# Patient Record
Sex: FEMALE | Race: ASIAN | NOT HISPANIC OR LATINO | URBAN - METROPOLITAN AREA
[De-identification: names, ages, dates, MRNs, and addresses within clinical notes are randomized per-mention and may not be internally consistent; named-entity substitution may affect disease eponyms.]

---

## 2021-12-29 ENCOUNTER — OUTPATIENT (OUTPATIENT)
Dept: OUTPATIENT SERVICES | Facility: HOSPITAL | Age: 41
LOS: 1 days | End: 2021-12-29
Payer: COMMERCIAL

## 2021-12-29 ENCOUNTER — APPOINTMENT (OUTPATIENT)
Dept: RADIOLOGY | Facility: IMAGING CENTER | Age: 41
End: 2021-12-29
Payer: COMMERCIAL

## 2021-12-29 DIAGNOSIS — Z00.8 ENCOUNTER FOR OTHER GENERAL EXAMINATION: ICD-10-CM

## 2021-12-29 PROCEDURE — 73030 X-RAY EXAM OF SHOULDER: CPT | Mod: 26,RT

## 2021-12-29 PROCEDURE — 73110 X-RAY EXAM OF WRIST: CPT | Mod: 26,RT

## 2021-12-29 PROCEDURE — 73030 X-RAY EXAM OF SHOULDER: CPT

## 2021-12-29 PROCEDURE — 73110 X-RAY EXAM OF WRIST: CPT

## 2022-01-04 ENCOUNTER — APPOINTMENT (OUTPATIENT)
Dept: ORTHOPEDIC SURGERY | Facility: CLINIC | Age: 42
End: 2022-01-04
Payer: COMMERCIAL

## 2022-01-04 VITALS
SYSTOLIC BLOOD PRESSURE: 121 MMHG | HEIGHT: 61 IN | HEART RATE: 69 BPM | BODY MASS INDEX: 33.99 KG/M2 | DIASTOLIC BLOOD PRESSURE: 83 MMHG | WEIGHT: 180 LBS

## 2022-01-04 DIAGNOSIS — M25.511 PAIN IN RIGHT SHOULDER: ICD-10-CM

## 2022-01-04 DIAGNOSIS — Z78.9 OTHER SPECIFIED HEALTH STATUS: ICD-10-CM

## 2022-01-04 DIAGNOSIS — M54.50 LOW BACK PAIN, UNSPECIFIED: ICD-10-CM

## 2022-01-04 DIAGNOSIS — M54.12 RADICULOPATHY, CERVICAL REGION: ICD-10-CM

## 2022-01-04 PROCEDURE — 72100 X-RAY EXAM L-S SPINE 2/3 VWS: CPT

## 2022-01-04 PROCEDURE — 72040 X-RAY EXAM NECK SPINE 2-3 VW: CPT

## 2022-01-04 PROCEDURE — 99203 OFFICE O/P NEW LOW 30 MIN: CPT

## 2022-01-04 RX ORDER — CHOLECALCIFEROL (VITAMIN D3) 25 MCG
TABLET ORAL
Refills: 0 | Status: ACTIVE | COMMUNITY

## 2022-01-04 NOTE — DISCUSSION/SUMMARY
[de-identified] : The patient has a sprain to the cervical and to the lumbar spine.  She has cervical radiculitis.  She has an element of right shoulder tendinitis as well.  I have discussed the pathology, natural history and treatment options with her.  She is started on a course of physical therapy.  She will take Tylenol for her pain.  If that is not adequate she will consider ibuprofen.  Medication risks have been reviewed.  She will be reevaluated in 6 weeks.

## 2022-01-04 NOTE — PHYSICAL EXAM
[DP] : dorsalis pedis 2+ and symmetric bilaterally [PT] : posterior tibial 2+ and symmetric bilaterally [Rad] : radial 2+ and symmetric bilaterally [Normal] : Alert and in no acute distress [Poor Appearance] : well-appearing [Acute Distress] : not in acute distress [Obese] : not obese [de-identified] : The patient has no respiratory distress. Mood and affect are normal. The patient is alert and oriented to person, place and time.\par Examination of the cervical spine demonstrates no deformity. There is tenderness of the right paracervical muscles and the right trapezius muscle. There is mild muscle spasm. Cervical spine range of motion is right lateral rotation of 40°, left lateral rotation of 40°, extension of 45° and flexion of 45°. Upper extremity neurologic exam is intact with regard to sensation. Motor function is 5 over 5 in all groups in the upper extremities. Deep tendon reflexes are 2+ and equal at the biceps, triceps and brachial radialis.\par Examination of the right shoulder demonstrates no deformity. The skin is intact. There is no erythema. There is tenderness anteriorly. Impingement sign is positive There is no instability. Drop arm test is negative. Empty can test is negative. Liftoff test is negative. Nobles test is negative.  She has elevation of 150 degrees right and left.  She has external rotation of 45 degrees right and left.  She has internal rotation to the mid lumbar level on the right and upper lumbar level on the left.  The elbows are stable.  There is no lymphedema.\par Examination of the lumbar spine demonstrates tenderness to the right of the midline. There is mild muscle spasm. There is no deformity. Lumbar flexion is 90°, right lateral flexion 10° and left lateral flexion 10°. Straight leg raise test is negative. Lower extremity neurologic exam is intact with regard to sensation, motor function and deep tendon reflexes.  Trendelenburg is negative.  There is no pain with hip range of motion.  There is no pain with knee motion.  There is no lymphedema. [de-identified] : \par EXAM: XR SHOULDER COMP MIN 2V RT\par PROCEDURE DATE: 12/29/2021\par INTERPRETATION: CLINICAL INDICATION: right shoulder pain\par EXAM:\par Internal and external rotation AP right shoulder from 12/29/2021 at 1133. No similar prior studies available for comparison.\par \par IMPRESSION:\par No fractures, dislocations, or AC separation.\par \par Preserved joint spaces and smooth articular margins.\par \par Maintained subacromial and coracoclavicular spaces.\par \par Bone island versus superimposed nodular soft tissue calcification projecting over lower lateral scapular margin. No additional focal osseous lesions.\par \par --- End of Report ---\par \par GAVI CUELLAR MD; Attending Radiologist\par This document has been electronically signed. Dec 29 2021 11:46AM\par \par \par \par EXAM: XR WRIST COMP MIN 3 VIEWS RT\par PROCEDURE DATE: 12/29/2021\par INTERPRETATION: CLINICAL INDICATION: right wrist pain\par EXAM:\par Frontal, oblique, and lateral right wrist from 12/29/2021 at 1133. No similar prior studies available for comparison.\par \par IMPRESSION:\par No fractures or dislocations.\par \par Preserved joint spaces and no joint margin erosions.\par \par Carpal bones normally aligned.\par \par Neutral ulnar variance.\par \par No lytic or blastic lesions.\par \par --- End of Report ---\par \par GAVI CUELLAR MD; Attending Radiologist\par This document has been electronically signed. Dec 29 2021 11:46AM\par \par \par X-rays of the right shoulder and right wrist taken December 29, 2021 are reviewed.  There are no fractures or dislocations.\par AP and lateral x-rays of the cervical spine taken today demonstrate no fracture or dislocation.  There is straightening of the normal curve.  AP and lateral x-rays of the lumbar spine taken today demonstrate no fracture, no dislocation and no bony abnormality.
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
## 2022-01-25 ENCOUNTER — RESULT REVIEW (OUTPATIENT)
Age: 42
End: 2022-01-25

## 2022-01-25 ENCOUNTER — APPOINTMENT (OUTPATIENT)
Dept: MRI IMAGING | Facility: CLINIC | Age: 42
End: 2022-01-25
Payer: COMMERCIAL

## 2022-01-25 ENCOUNTER — OUTPATIENT (OUTPATIENT)
Dept: OUTPATIENT SERVICES | Facility: HOSPITAL | Age: 42
LOS: 1 days | End: 2022-01-25
Payer: COMMERCIAL

## 2022-01-25 DIAGNOSIS — R42 DIZZINESS AND GIDDINESS: ICD-10-CM

## 2022-01-25 PROCEDURE — 70551 MRI BRAIN STEM W/O DYE: CPT

## 2022-01-25 PROCEDURE — 70551 MRI BRAIN STEM W/O DYE: CPT | Mod: 26

## 2022-06-07 ENCOUNTER — OUTPATIENT (OUTPATIENT)
Dept: OUTPATIENT SERVICES | Facility: HOSPITAL | Age: 42
LOS: 1 days | End: 2022-06-07
Payer: COMMERCIAL

## 2022-06-07 ENCOUNTER — APPOINTMENT (OUTPATIENT)
Dept: MAMMOGRAPHY | Facility: IMAGING CENTER | Age: 42
End: 2022-06-07
Payer: COMMERCIAL

## 2022-06-07 DIAGNOSIS — Z00.8 ENCOUNTER FOR OTHER GENERAL EXAMINATION: ICD-10-CM

## 2022-06-07 PROCEDURE — 77063 BREAST TOMOSYNTHESIS BI: CPT

## 2022-06-07 PROCEDURE — 77067 SCR MAMMO BI INCL CAD: CPT | Mod: 26

## 2022-06-07 PROCEDURE — 77067 SCR MAMMO BI INCL CAD: CPT

## 2022-06-07 PROCEDURE — 77063 BREAST TOMOSYNTHESIS BI: CPT | Mod: 26

## 2022-08-13 ENCOUNTER — OUTPATIENT (OUTPATIENT)
Dept: OUTPATIENT SERVICES | Facility: HOSPITAL | Age: 42
LOS: 1 days | End: 2022-08-13
Payer: COMMERCIAL

## 2022-08-13 ENCOUNTER — APPOINTMENT (OUTPATIENT)
Dept: ULTRASOUND IMAGING | Facility: IMAGING CENTER | Age: 42
End: 2022-08-13

## 2022-08-13 ENCOUNTER — APPOINTMENT (OUTPATIENT)
Dept: MAMMOGRAPHY | Facility: IMAGING CENTER | Age: 42
End: 2022-08-13

## 2022-08-13 DIAGNOSIS — Z00.8 ENCOUNTER FOR OTHER GENERAL EXAMINATION: ICD-10-CM

## 2022-08-13 PROCEDURE — 76642 ULTRASOUND BREAST LIMITED: CPT | Mod: 26,RT

## 2022-08-13 PROCEDURE — G0279: CPT | Mod: 26

## 2022-08-13 PROCEDURE — 77065 DX MAMMO INCL CAD UNI: CPT

## 2022-08-13 PROCEDURE — 77065 DX MAMMO INCL CAD UNI: CPT | Mod: 26,RT

## 2022-08-13 PROCEDURE — 76642 ULTRASOUND BREAST LIMITED: CPT

## 2022-08-13 PROCEDURE — G0279: CPT

## 2023-12-25 ENCOUNTER — EMERGENCY (EMERGENCY)
Facility: HOSPITAL | Age: 43
LOS: 1 days | Discharge: HOME | End: 2023-12-25
Admitting: EMERGENCY MEDICINE
Payer: COMMERCIAL

## 2023-12-25 VITALS
DIASTOLIC BLOOD PRESSURE: 85 MMHG | OXYGEN SATURATION: 98 % | SYSTOLIC BLOOD PRESSURE: 137 MMHG | HEART RATE: 75 BPM | RESPIRATION RATE: 18 BRPM | TEMPERATURE: 98 F

## 2023-12-25 LAB
APPEARANCE UR: ABNORMAL
APPEARANCE UR: ABNORMAL
BACTERIA # UR AUTO: NEGATIVE /HPF — SIGNIFICANT CHANGE UP
BACTERIA # UR AUTO: NEGATIVE /HPF — SIGNIFICANT CHANGE UP
BILIRUB UR-MCNC: NEGATIVE — SIGNIFICANT CHANGE UP
BILIRUB UR-MCNC: NEGATIVE — SIGNIFICANT CHANGE UP
CAST: 0 /LPF — SIGNIFICANT CHANGE UP (ref 0–4)
CAST: 0 /LPF — SIGNIFICANT CHANGE UP (ref 0–4)
COLOR SPEC: YELLOW — SIGNIFICANT CHANGE UP
COLOR SPEC: YELLOW — SIGNIFICANT CHANGE UP
DIFF PNL FLD: ABNORMAL
DIFF PNL FLD: ABNORMAL
GLUCOSE UR QL: NEGATIVE MG/DL — SIGNIFICANT CHANGE UP
GLUCOSE UR QL: NEGATIVE MG/DL — SIGNIFICANT CHANGE UP
KETONES UR-MCNC: NEGATIVE MG/DL — SIGNIFICANT CHANGE UP
KETONES UR-MCNC: NEGATIVE MG/DL — SIGNIFICANT CHANGE UP
LEUKOCYTE ESTERASE UR-ACNC: NEGATIVE — SIGNIFICANT CHANGE UP
LEUKOCYTE ESTERASE UR-ACNC: NEGATIVE — SIGNIFICANT CHANGE UP
NITRITE UR-MCNC: NEGATIVE — SIGNIFICANT CHANGE UP
NITRITE UR-MCNC: NEGATIVE — SIGNIFICANT CHANGE UP
PH UR: 8 — SIGNIFICANT CHANGE UP (ref 5–8)
PH UR: 8 — SIGNIFICANT CHANGE UP (ref 5–8)
PROT UR-MCNC: NEGATIVE MG/DL — SIGNIFICANT CHANGE UP
PROT UR-MCNC: NEGATIVE MG/DL — SIGNIFICANT CHANGE UP
RBC CASTS # UR COMP ASSIST: 5 /HPF — HIGH (ref 0–4)
RBC CASTS # UR COMP ASSIST: 5 /HPF — HIGH (ref 0–4)
SP GR SPEC: 1.02 — SIGNIFICANT CHANGE UP (ref 1–1.03)
SP GR SPEC: 1.02 — SIGNIFICANT CHANGE UP (ref 1–1.03)
SQUAMOUS # UR AUTO: 1 /HPF — SIGNIFICANT CHANGE UP (ref 0–5)
SQUAMOUS # UR AUTO: 1 /HPF — SIGNIFICANT CHANGE UP (ref 0–5)
UROBILINOGEN FLD QL: 0.2 MG/DL — SIGNIFICANT CHANGE UP (ref 0.2–1)
UROBILINOGEN FLD QL: 0.2 MG/DL — SIGNIFICANT CHANGE UP (ref 0.2–1)
WBC UR QL: 1 /HPF — SIGNIFICANT CHANGE UP (ref 0–5)
WBC UR QL: 1 /HPF — SIGNIFICANT CHANGE UP (ref 0–5)

## 2023-12-25 PROCEDURE — 99284 EMERGENCY DEPT VISIT MOD MDM: CPT

## 2023-12-25 RX ORDER — KETOROLAC TROMETHAMINE 30 MG/ML
30 SYRINGE (ML) INJECTION ONCE
Refills: 0 | Status: DISCONTINUED | OUTPATIENT
Start: 2023-12-25 | End: 2023-12-25

## 2023-12-25 RX ORDER — DIAZEPAM 5 MG
5 TABLET ORAL ONCE
Refills: 0 | Status: DISCONTINUED | OUTPATIENT
Start: 2023-12-25 | End: 2023-12-25

## 2023-12-25 RX ADMIN — Medication 30 MILLIGRAM(S): at 21:44

## 2023-12-25 RX ADMIN — Medication 5 MILLIGRAM(S): at 21:44

## 2023-12-25 NOTE — ED ADULT TRIAGE NOTE - CHIEF COMPLAINT QUOTE
pt c/o of lower back pain s/p trauma 3 weeks ago, pt ambulatory, no neuro deficits noted, denies dysuria

## 2023-12-25 NOTE — ED ADULT NURSE NOTE - NSFALLRISKINTERV_ED_ALL_ED
Communicate fall risk and risk factors to all staff, patient, and family/Provide visual cue: yellow wristband, yellow gown, etc/Reinforce activity limits and safety measures with patient and family/Call bell, personal items and telephone in reach/Instruct patient to call for assistance before getting out of bed/chair/stretcher/Non-slip footwear applied when patient is off stretcher/[place] to call system/Physically safe environment - no spills, clutter or unnecessary equipment/Purposeful Proactive Rounding/Room/bathroom lighting operational, light cord in reach Communicate fall risk and risk factors to all staff, patient, and family/Provide visual cue: yellow wristband, yellow gown, etc/Reinforce activity limits and safety measures with patient and family/Call bell, personal items and telephone in reach/Instruct patient to call for assistance before getting out of bed/chair/stretcher/Non-slip footwear applied when patient is off stretcher/[place] to call system/Physically safe environment - no spills, clutter or unnecessary equipment/Purposeful Proactive Rounding/Room/bathroom lighting operational, light cord in reach

## 2023-12-25 NOTE — ED PROVIDER NOTE - CLINICAL SUMMARY MEDICAL DECISION MAKING FREE TEXT BOX
43-year-old female with past medical history of presents with lower back pain status post fall around Thanksgiving.    Differential diagnoses includes lumbago versus musculoskeletal spasm / strain versus sciatica, unlikely fx given acuity but as there is trauma is still on the inferential. No back pain red flags on history or physical. Presentation not consistent with malignancy (lack of history of malignancy, lack of B symptoms), cauda equina (no bowel or urinary incontinence/retention, no saddle anesthesia, no distal weakness), AAA, viscus perforation , pulmonary embolism, renal colic, pyelonephritis (afebrile, no CVAT, no urinary symptoms). Given the clinical picture, no indication for imaging at this time.    Plan: CT, pain control, supportive care, reassess

## 2023-12-25 NOTE — ED PROVIDER NOTE - OBJECTIVE STATEMENT
43-year-old female with past medical history of presents with lower back pain status post fall around [facility].    Patient states she was washing her baby in the bathroom when she slipped and hit her lower back pain to the corner of the base and and has been experiencing pain ever since then.  Patient states pain is different than her sciatic pain as she is not feeling the paresthesias type pain that she typically feels but rather is experiencing a shooting pain in her left hip going into the knee and into the lower back.  States she did not hit her head or lose consciousness.  Typically with her sciatica patient states she experiencing shooting pain down her left leg and feet.  Since then patient states she has been taking acetaminophen/ibuprofen with minimal to no relief.  In addition to this she has seen a chiropractor twice which has not helped her pain at all.  Patient states since having this injury she has not had any imaging done.    Denies fevers, chills, bowel or urine incontinence, paresthesias, weakness, saddle anesthesia, rectal bleeding or pain, abdominal pain, nausea, vomiting, diarrhea, constipation, chest pain, shortness of breath, rash, upper extremity pain, swelling, change in color or temperature to extremities, dizziness, lightheadedness, syncope. 43-year-old female with past medical history of presents with lower back pain status post fall around [facility].    Patient states she was washing her baby in the bathroom when she slipped and hit her lower back pain to the corner of the base and and has been experiencing pain ever since then.  Patient states pain is different than her sciatic pain as she is not feeling the paresthesias type pain that she typically feels but rather is experiencing a shooting pain in her left hip going into the knee and into the lower back.  States she did not hit her head or lose consciousness.  Typically with her sciatica patient states she experiencing shooting pain down her left leg and feet.  Since then patient states she has been taking acetaminophen/ibuprofen with minimal to no relief.  In addition to this she has seen a chiropractor twice which has not helped her pain at all.  Patient states since having this injury she has not had any imaging done.    Denies fevers, chills, bowel or urine incontinence, paresthesias, weakness, saddle anesthesia, rectal bleeding or pain, abdominal pain, nausea, vomiting, diarrhea, constipation, chest pain, shortness of breath, rash, upper extremity pain, swelling, change in color or temperature to extremities, dizziness, lightheadedness, syncope.

## 2023-12-25 NOTE — ED PROVIDER NOTE - PROGRESS NOTE DETAILS
ROMIE Wu: Patient states after pain medication has been able to sleep comfortably in the stretcher and feels well.  Patient pending CT at time of reassessment.  Patient aware there are 3 patients of the head of her imaging.  Will continue to monitor ROMIE GARCIA: Patient signed out to me to f/u CT L spine and pelvis. CT L spine and pelvis w/ no acute fracture. Patient reassessed, sitting comfortably in chair in NAD, denies any complaints. States feeling better, symptoms improved, ambulating w/o difficulty. Pt is medically stable for discharge and follow up with PMD and orthopedic spine. The patient was given verbal and written discharge instructions. Specifically, instructions when to return to the ED and when to seek follow-up from their pcp was discussed. Any specialty follow-up was discussed, including how to make an appointment.  Instructions were discussed in simple, plain language and was understood by the patient. The patient understands that should their symptoms worsen or any new symptoms arise, they should return to the ED immediately for further evaluation. All pt's questions were answered. Patient verbalizes understanding.

## 2023-12-25 NOTE — ED ADULT NURSE NOTE - OBJECTIVE STATEMENT
pt received to intake room 10a a&ox4 ambulatory with no past medical history c/o lower back pain after single mechanical slip and fall three weeks ago. pt denies head strike or LOC. pt c/o pain to bilateral lower lumbar region. pt ambulatory with steady gait. pt with no neuro or sensory deficits. normal strength and sensation to x4 extremities. pt denies chest pain, sob, nausea, vomiting, dizziness, headache, fevers/chills. ucg negative. pt medicated as per EMAR. pt pending CT. pt taken to RW.

## 2023-12-25 NOTE — ED PROVIDER NOTE - NSFOLLOWUPINSTRUCTIONS_ED_ALL_ED_FT
** You were seen in the emergency room for pain to your back.  Your CT scan did not show any acute bony abnormalities, please take pain medication as discussed.  Will provide you with orthopedic clinic information for follow-up if pain continues.  Please return if any new, worsening or concerning symptoms develop. **    Acute Back Pain, Adult  Acute back pain is sudden and usually short-lived. It is often caused by an injury to the muscles and tissues in the back. The injury may result from:  A muscle, tendon, or ligament getting overstretched or torn. Ligaments are tissues that connect bones to each other. Lifting something improperly can cause a back strain.  Wear and tear (degeneration) of the spinal disks. Spinal disks are circular tissue that provide cushioning between the bones of the spine (vertebrae).  Twisting motions, such as while playing sports or doing yard work.  A hit to the back.  Arthritis.  You may have a physical exam, lab tests, and imaging tests to find the cause of your pain. Acute back pain usually goes away with rest and home care.    Follow these instructions at home:  Managing pain, stiffness, and swelling    Take over-the-counter and prescription medicines only as told by your health care provider. Treatment may include medicines for pain and inflammation that are taken by mouth or applied to the skin, or muscle relaxants.  Your health care provider may recommend applying ice during the first 24–48 hours after your pain starts. To do this:  Put ice in a plastic bag.  Place a towel between your skin and the bag.  Leave the ice on for 20 minutes, 2–3 times a day.  Remove the ice if your skin turns bright red. This is very important. If you cannot feel pain, heat, or cold, you have a greater risk of damage to the area.  If directed, apply heat to the affected area as often as told by your health care provider. Use the heat source that your health care provider recommends, such as a moist heat pack or a heating pad.  Place a towel between your skin and the heat source.  Leave the heat on for 20–30 minutes.  Remove the heat if your skin turns bright red. This is especially important if you are unable to feel pain, heat, or cold. You have a greater risk of getting burned.  Activity    Comparisons of good and bad posture while driving, standing, sitting at a desk, and lifting heavy objects.  Do not stay in bed. Staying in bed for more than 1–2 days can delay your recovery.  Sit up and stand up straight. Avoid leaning forward when you sit or hunching over when you stand.  If you work at a desk, sit close to it so you do not need to lean over. Keep your chin tucked in. Keep your neck drawn back, and keep your elbows bent at a 90-degree angle (right angle).  Sit high and close to the steering wheel when you drive. Add lower back (lumbar) support to your car seat, if needed.  Take short walks on even surfaces as soon as you are able. Try to increase the length of time you walk each day.  Do not sit, drive, or  one place for more than 30 minutes at a time. Sitting or standing for long periods of time can put stress on your back.  Do not drive or use heavy machinery while taking prescription pain medicine.  Use proper lifting techniques. When you bend and lift, use positions that put less stress on your back:  Bend your knees.  Keep the load close to your body.  Avoid twisting.  Exercise regularly as told by your health care provider. Exercising helps your back heal faster and helps prevent back injuries by keeping muscles strong and flexible.  Work with a physical therapist to make a safe exercise program, as recommended by your health care provider. Do any exercises as told by your physical therapist.  Lifestyle    Maintain a healthy weight. Extra weight puts stress on your back and makes it difficult to have good posture.  Avoid activities or situations that make you feel anxious or stressed. Stress and anxiety increase muscle tension and can make back pain worse. Learn ways to manage anxiety and stress, such as through exercise.  General instructions    Sleep on a firm mattress in a comfortable position. Try lying on your side with your knees slightly bent. If you lie on your back, put a pillow under your knees.  Keep your head and neck in a straight line with your spine (neutral position) when using electronic equipment like smartphones or pads. To do this:  Raise your smartphone or pad to look at it instead of bending your head or neck to look down.  Put the smartphone or pad at the level of your face while looking at the screen.  Follow your treatment plan as told by your health care provider. This may include:  Cognitive or behavioral therapy.  Acupuncture or massage therapy.  Meditation or yoga.  Contact a health care provider if:  You have pain that is not relieved with rest or medicine.  You have increasing pain going down into your legs or buttocks.  Your pain does not improve after 2 weeks.  You have pain at night.  You lose weight without trying.  You have a fever or chills.  You develop nausea or vomiting.  You develop abdominal pain.  Get help right away if:  You develop new bowel or bladder control problems.  You have unusual weakness or numbness in your arms or legs.  You feel faint.  These symptoms may represent a serious problem that is an emergency. Do not wait to see if the symptoms will go away. Get medical help right away. Call your local emergency services (911 in the [place] Do not drive yourself to the hospital.    Summary  Acute back pain is sudden and usually short-lived.  Use proper lifting techniques. When you bend and lift, use positions that put less stress on your back.  Take over-the-counter and prescription medicines only as told by your health care provider, and apply heat or ice as told.  This information is not intended to replace advice given to you by your health care provider. Make sure you discuss any questions you have with your health care provider. ** You were seen in the emergency room for pain to your back.  Your CT scan did not show any acute bony abnormalities, please take pain medication as discussed.  Will provide you with orthopedic clinic information for follow-up if pain continues.  Please return if any new, worsening or concerning symptoms develop. **    Take Motrin/Ibuprofen 600 mg every 6-8 hours as needed for moderate pain -- take with food.     Acute Back Pain, Adult  Acute back pain is sudden and usually short-lived. It is often caused by an injury to the muscles and tissues in the back. The injury may result from:  A muscle, tendon, or ligament getting overstretched or torn. Ligaments are tissues that connect bones to each other. Lifting something improperly can cause a back strain.  Wear and tear (degeneration) of the spinal disks. Spinal disks are circular tissue that provide cushioning between the bones of the spine (vertebrae).  Twisting motions, such as while playing sports or doing yard work.  A hit to the back.  Arthritis.  You may have a physical exam, lab tests, and imaging tests to find the cause of your pain. Acute back pain usually goes away with rest and home care.    Follow these instructions at home:  Managing pain, stiffness, and swelling    Take over-the-counter and prescription medicines only as told by your health care provider. Treatment may include medicines for pain and inflammation that are taken by mouth or applied to the skin, or muscle relaxants.  Your health care provider may recommend applying ice during the first 24–48 hours after your pain starts. To do this:  Put ice in a plastic bag.  Place a towel between your skin and the bag.  Leave the ice on for 20 minutes, 2–3 times a day.  Remove the ice if your skin turns bright red. This is very important. If you cannot feel pain, heat, or cold, you have a greater risk of damage to the area.  If directed, apply heat to the affected area as often as told by your health care provider. Use the heat source that your health care provider recommends, such as a moist heat pack or a heating pad.  Place a towel between your skin and the heat source.  Leave the heat on for 20–30 minutes.  Remove the heat if your skin turns bright red. This is especially important if you are unable to feel pain, heat, or cold. You have a greater risk of getting burned.  Activity    Comparisons of good and bad posture while driving, standing, sitting at a desk, and lifting heavy objects.  Do not stay in bed. Staying in bed for more than 1–2 days can delay your recovery.  Sit up and stand up straight. Avoid leaning forward when you sit or hunching over when you stand.  If you work at a desk, sit close to it so you do not need to lean over. Keep your chin tucked in. Keep your neck drawn back, and keep your elbows bent at a 90-degree angle (right angle).  Sit high and close to the steering wheel when you drive. Add lower back (lumbar) support to your car seat, if needed.  Take short walks on even surfaces as soon as you are able. Try to increase the length of time you walk each day.  Do not sit, drive, or  one place for more than 30 minutes at a time. Sitting or standing for long periods of time can put stress on your back.  Do not drive or use heavy machinery while taking prescription pain medicine.  Use proper lifting techniques. When you bend and lift, use positions that put less stress on your back:  Bend your knees.  Keep the load close to your body.  Avoid twisting.  Exercise regularly as told by your health care provider. Exercising helps your back heal faster and helps prevent back injuries by keeping muscles strong and flexible.  Work with a physical therapist to make a safe exercise program, as recommended by your health care provider. Do any exercises as told by your physical therapist.  Lifestyle    Maintain a healthy weight. Extra weight puts stress on your back and makes it difficult to have good posture.  Avoid activities or situations that make you feel anxious or stressed. Stress and anxiety increase muscle tension and can make back pain worse. Learn ways to manage anxiety and stress, such as through exercise.  General instructions    Sleep on a firm mattress in a comfortable position. Try lying on your side with your knees slightly bent. If you lie on your back, put a pillow under your knees.  Keep your head and neck in a straight line with your spine (neutral position) when using electronic equipment like smartphones or pads. To do this:  Raise your smartphone or pad to look at it instead of bending your head or neck to look down.  Put the smartphone or pad at the level of your face while looking at the screen.  Follow your treatment plan as told by your health care provider. This may include:  Cognitive or behavioral therapy.  Acupuncture or massage therapy.  Meditation or yoga.  Contact a health care provider if:  You have pain that is not relieved with rest or medicine.  You have increasing pain going down into your legs or buttocks.  Your pain does not improve after 2 weeks.  You have pain at night.  You lose weight without trying.  You have a fever or chills.  You develop nausea or vomiting.  You develop abdominal pain.  Get help right away if:  You develop new bowel or bladder control problems.  You have unusual weakness or numbness in your arms or legs.  You feel faint.  These symptoms may represent a serious problem that is an emergency. Do not wait to see if the symptoms will go away. Get medical help right away. Call your local emergency services (911 in the [place] Do not drive yourself to the hospital.    Summary  Acute back pain is sudden and usually short-lived.  Use proper lifting techniques. When you bend and lift, use positions that put less stress on your back.  Take over-the-counter and prescription medicines only as told by your health care provider, and apply heat or ice as told.  This information is not intended to replace advice given to you by your health care provider. Make sure you discuss any questions you have with your health care provider.

## 2023-12-25 NOTE — ED PROVIDER NOTE - PATIENT PORTAL LINK FT
You can access the FollowMyHealth Patient Portal offered by [facility] by registering at the following website: [email or web address]. By joining [employer]’s FollowMyHealth portal, you will also be able to view your health information using other applications (apps) compatible with our system. You can access the FollowMyHealth Patient Portal offered by [facility] by registering at the following website: [email or web address]. By joining [employer]’s FollowMyHealth portal, you will also be able to view your health information using other applications (apps) compatible with our system.

## 2023-12-26 VITALS
HEART RATE: 62 BPM | OXYGEN SATURATION: 98 % | TEMPERATURE: 98 F | RESPIRATION RATE: 18 BRPM | DIASTOLIC BLOOD PRESSURE: 75 MMHG | SYSTOLIC BLOOD PRESSURE: 116 MMHG

## 2023-12-26 LAB
CULTURE RESULTS: SIGNIFICANT CHANGE UP
CULTURE RESULTS: SIGNIFICANT CHANGE UP
SPECIMEN SOURCE: SIGNIFICANT CHANGE UP
SPECIMEN SOURCE: SIGNIFICANT CHANGE UP

## 2023-12-26 PROCEDURE — 72192 CT PELVIS W/O DYE: CPT | Mod: 26,MA

## 2023-12-26 PROCEDURE — 72131 CT LUMBAR SPINE W/O DYE: CPT | Mod: 26,MA

## 2023-12-26 RX ORDER — IBUPROFEN 200 MG
1 TABLET ORAL
Qty: 28 | Refills: 0
Start: 2023-12-26 | End: 2024-01-01

## 2024-02-08 PROBLEM — M54.30 SCIATICA, UNSPECIFIED SIDE: Chronic | Status: ACTIVE | Noted: 2023-12-25

## 2024-02-13 ENCOUNTER — APPOINTMENT (OUTPATIENT)
Dept: MAMMOGRAPHY | Facility: IMAGING CENTER | Age: 44
End: 2024-02-13

## 2024-02-13 ENCOUNTER — APPOINTMENT (OUTPATIENT)
Dept: ULTRASOUND IMAGING | Facility: IMAGING CENTER | Age: 44
End: 2024-02-13

## 2024-03-26 ENCOUNTER — APPOINTMENT (OUTPATIENT)
Dept: ORTHOPEDIC SURGERY | Facility: CLINIC | Age: 44
End: 2024-03-26
Payer: COMMERCIAL

## 2024-03-26 VITALS — WEIGHT: 180 LBS | BODY MASS INDEX: 33.99 KG/M2 | HEIGHT: 61 IN

## 2024-03-26 DIAGNOSIS — G83.4 CAUDA EQUINA SYNDROME: ICD-10-CM

## 2024-03-26 PROCEDURE — 99205 OFFICE O/P NEW HI 60 MIN: CPT

## 2024-03-26 PROCEDURE — 99215 OFFICE O/P EST HI 40 MIN: CPT

## 2024-03-26 PROCEDURE — 72100 X-RAY EXAM L-S SPINE 2/3 VWS: CPT

## 2024-04-01 ENCOUNTER — OUTPATIENT (OUTPATIENT)
Dept: OUTPATIENT SERVICES | Facility: HOSPITAL | Age: 44
LOS: 1 days | End: 2024-04-01
Payer: COMMERCIAL

## 2024-04-01 VITALS
HEIGHT: 61 IN | WEIGHT: 179.02 LBS | DIASTOLIC BLOOD PRESSURE: 89 MMHG | TEMPERATURE: 98 F | OXYGEN SATURATION: 100 % | HEART RATE: 76 BPM | SYSTOLIC BLOOD PRESSURE: 136 MMHG | RESPIRATION RATE: 16 BRPM

## 2024-04-01 DIAGNOSIS — M51.36 OTHER INTERVERTEBRAL DISC DEGENERATION, LUMBAR REGION: ICD-10-CM

## 2024-04-01 DIAGNOSIS — G83.4 CAUDA EQUINA SYNDROME: ICD-10-CM

## 2024-04-01 DIAGNOSIS — Z01.818 ENCOUNTER FOR OTHER PREPROCEDURAL EXAMINATION: ICD-10-CM

## 2024-04-01 DIAGNOSIS — M51.26 OTHER INTERVERTEBRAL DISC DISPLACEMENT, LUMBAR REGION: ICD-10-CM

## 2024-04-01 DIAGNOSIS — Z78.9 OTHER SPECIFIED HEALTH STATUS: Chronic | ICD-10-CM

## 2024-04-01 LAB
ANION GAP SERPL CALC-SCNC: 14 MMOL/L — SIGNIFICANT CHANGE UP (ref 5–17)
BLD GP AB SCN SERPL QL: NEGATIVE — SIGNIFICANT CHANGE UP
BUN SERPL-MCNC: 11 MG/DL — SIGNIFICANT CHANGE UP (ref 7–23)
CALCIUM SERPL-MCNC: 9.8 MG/DL — SIGNIFICANT CHANGE UP (ref 8.4–10.5)
CHLORIDE SERPL-SCNC: 104 MMOL/L — SIGNIFICANT CHANGE UP (ref 96–108)
CO2 SERPL-SCNC: 24 MMOL/L — SIGNIFICANT CHANGE UP (ref 22–31)
CREAT SERPL-MCNC: 0.76 MG/DL — SIGNIFICANT CHANGE UP (ref 0.5–1.3)
EGFR: 100 ML/MIN/1.73M2 — SIGNIFICANT CHANGE UP
GLUCOSE SERPL-MCNC: 91 MG/DL — SIGNIFICANT CHANGE UP (ref 70–99)
HCT VFR BLD CALC: 38.3 % — SIGNIFICANT CHANGE UP (ref 34.5–45)
HGB BLD-MCNC: 13 G/DL — SIGNIFICANT CHANGE UP (ref 11.5–15.5)
MCHC RBC-ENTMCNC: 27.5 PG — SIGNIFICANT CHANGE UP (ref 27–34)
MCHC RBC-ENTMCNC: 33.9 GM/DL — SIGNIFICANT CHANGE UP (ref 32–36)
MCV RBC AUTO: 81 FL — SIGNIFICANT CHANGE UP (ref 80–100)
NRBC # BLD: 0 /100 WBCS — SIGNIFICANT CHANGE UP (ref 0–0)
PLATELET # BLD AUTO: 286 K/UL — SIGNIFICANT CHANGE UP (ref 150–400)
POTASSIUM SERPL-MCNC: 4.5 MMOL/L — SIGNIFICANT CHANGE UP (ref 3.5–5.3)
POTASSIUM SERPL-SCNC: 4.5 MMOL/L — SIGNIFICANT CHANGE UP (ref 3.5–5.3)
RBC # BLD: 4.73 M/UL — SIGNIFICANT CHANGE UP (ref 3.8–5.2)
RBC # FLD: 12.6 % — SIGNIFICANT CHANGE UP (ref 10.3–14.5)
RH IG SCN BLD-IMP: POSITIVE — SIGNIFICANT CHANGE UP
SODIUM SERPL-SCNC: 142 MMOL/L — SIGNIFICANT CHANGE UP (ref 135–145)
WBC # BLD: 7.98 K/UL — SIGNIFICANT CHANGE UP (ref 3.8–10.5)
WBC # FLD AUTO: 7.98 K/UL — SIGNIFICANT CHANGE UP (ref 3.8–10.5)

## 2024-04-01 PROCEDURE — G0463: CPT

## 2024-04-01 PROCEDURE — 83036 HEMOGLOBIN GLYCOSYLATED A1C: CPT

## 2024-04-01 PROCEDURE — 85027 COMPLETE CBC AUTOMATED: CPT

## 2024-04-01 PROCEDURE — 87641 MR-STAPH DNA AMP PROBE: CPT

## 2024-04-01 PROCEDURE — 86900 BLOOD TYPING SEROLOGIC ABO: CPT

## 2024-04-01 PROCEDURE — 87640 STAPH A DNA AMP PROBE: CPT

## 2024-04-01 PROCEDURE — 86850 RBC ANTIBODY SCREEN: CPT

## 2024-04-01 PROCEDURE — 80048 BASIC METABOLIC PNL TOTAL CA: CPT

## 2024-04-01 PROCEDURE — 86901 BLOOD TYPING SEROLOGIC RH(D): CPT

## 2024-04-01 RX ORDER — ACETAMINOPHEN 500 MG
1000 TABLET ORAL ONCE
Refills: 0 | Status: COMPLETED | OUTPATIENT
Start: 2024-04-03 | End: 2024-04-03

## 2024-04-01 RX ORDER — SODIUM CHLORIDE 9 MG/ML
3 INJECTION INTRAMUSCULAR; INTRAVENOUS; SUBCUTANEOUS EVERY 8 HOURS
Refills: 0 | Status: DISCONTINUED | OUTPATIENT
Start: 2024-04-03 | End: 2024-04-03

## 2024-04-01 RX ORDER — LIDOCAINE HCL 20 MG/ML
0.2 VIAL (ML) INJECTION ONCE
Refills: 0 | Status: DISCONTINUED | OUTPATIENT
Start: 2024-04-03 | End: 2024-04-03

## 2024-04-01 RX ORDER — CHLORHEXIDINE GLUCONATE 213 G/1000ML
1 SOLUTION TOPICAL ONCE
Refills: 0 | Status: DISCONTINUED | OUTPATIENT
Start: 2024-04-03 | End: 2024-04-03

## 2024-04-01 RX ORDER — APREPITANT 80 MG/1
40 CAPSULE ORAL ONCE
Refills: 0 | Status: COMPLETED | OUTPATIENT
Start: 2024-04-03 | End: 2024-04-03

## 2024-04-01 RX ORDER — CEFAZOLIN SODIUM 1 G
2000 VIAL (EA) INJECTION ONCE
Refills: 0 | Status: COMPLETED | OUTPATIENT
Start: 2024-04-03 | End: 2024-04-03

## 2024-04-01 NOTE — H&P PST ADULT - ASSESSMENT
DASI score:  DASI activity: Limited due to pain. Can tolerate short walking distances up to 20 feet.  Loose teeth or denture:denies   DASI score:  DASI activity: Limited due to pain. Can tolerate short walking distances up to 20 feet.  Loose teeth or denture: denies   DASI score: 5.07  DASI activity: Limited due to pain. Can tolerate short walking distances up to 20 feet.  Loose teeth or denture: denies

## 2024-04-01 NOTE — H&P PST ADULT - ATTENDING COMMENTS
42 yo female with L45 HNP, cauda equina compression, with severe compression of nerve roots,based on MRI, patient has been clinically worsening for over 3 months with increased radiculopathy, numbness and tingling, and weakness, she i having episodic numbness and tingling in groin, my concern is she may develop cauda equina syndrome, she is unable to ambulate more then 20 feet. On PE she has reduced sensation in L5 distribution Left side, and 3/5 strength TA, EHL, Peroneals left side.  She has had 6 weeks of chiropracter care, NSAIDS, flexeril, and HEP under direction of neurologist and chiropracter. Recommend posteriror lumbar laminectomy and discectomy. On morning of surgery, I re-examined patient, reviewed H and P, MRI, and reviewed surgical diagnosis and treatment plan.   ?  I reviewed all major risks, benefits, and complications associated with lumbar laminectomy and/or microdiscectomy including but not limited to bleeding, infection, CSF leak, neurological injury, chronic pain, reherniation, hematoma worsening of pain, anesthetic risk, chronic pain and disability, need for further surgical intervention, medical complications (GI, , DVT, PE, cardiac, pulmonary, etc) and risk of mortality.

## 2024-04-01 NOTE — H&P PST ADULT - MUSCULOSKELETAL
no joint swelling/no joint warmth/no calf tenderness/decreased ROM due to pain/strength 5/5 bilateral upper extremities/strength 5/5 bilateral lower extremities details…

## 2024-04-01 NOTE — H&P PST ADULT - HISTORY OF PRESENT ILLNESS
History of Present Illness   Pt presents with c/o low back pain since 11/2023. Pt states she was bending down helping her son bath when she stood up and hit he back with the countertop corner, pain Pt states pain was improving, then on 12/2023 had exacerbation of her pain, pt went to [facility] , had CT done and was treated with NSAID I and d/c home same day. Pt saw Neurologist Dr Khalil, Low back pain radiates to left buttock, LLE to ankle, associated with tingling and weakness and occasional tingling on RLE. Pt takes Cyclobenzaprine and Diclofenac, these provides mild pain relief. Pt f/u with chiropractor for the past 2 months X2/week this provided no pain relief. Pt denies having LENA in the past, does not participate with PT at this time  Pt denies fever, chills, weight changes, loss of bladder control, bowel incontinence or urinary retention or saddle anesthesia  The patient's past medical history, past surgical history, medications, allergies, and social history were reviewed by me today with the patient and documented accordingly. In addition, the patient's family history, which is noncontributory to this visit, was also reviewed.              Modifying factors - worsened by bending, worsened by lifting, worsened by prolonged sitting, worsened by prolonged standing, worsened by standing and worsened by walking. Relieving factors include relieved by ice and relieved by rest, but not relieved by chiropractic manipulation.   Associated Symptoms: no ataxia, no incontinence, good dexterity and no urinary retention.    This is a 43 year old female no significant medical history. Patient with  c/o low back pain since 11/2023. Pt states she was bending down helping her son bath when she stood up and hit he back with the countertop corner, pain Pt states pain was improving, then on 12/2023 had exacerbation of her pain, pt went to [facility] , had CT done and was treated with NSAID I and d/c home same day. Pt saw Neurologist Dr Khalil, Low back pain radiates to left buttock, LLE to ankle, associated with tingling and weakness and occasional tingling on RLE.  Pt f/u with chiropractor for the past 2 months X2/week this provided no pain relief.  No longer goes to PT. Pt takes Cyclobenzaprine and baclofen, these provides mild pain relief.   Modifying factors - worsened by bending, worsened by lifting, worsened by prolonged sitting, worsened by prolonged standing, worsened by standing and worsened by walking. Relieving factors include relieved by ice and relieved by rest.    Today patient presents to PST for evaluation for scheduled L4-L5 Posterior Lumbar bilateral laminotomy and Discectomy on 4/3/24.  Pt denies fever, chills, weight changes, loss of bladder control, bowel incontinence or urinary retention. Denies fever, chills, SOB, Chestpain, or  palpitations.

## 2024-04-01 NOTE — H&P PST ADULT - PROBLEM SELECTOR PLAN 1
Scheduled for L4-L5 posterior lumbar bilateral laminotomy and discectomy on 4/3/24 with Dr. Farfan  Pre-operative instructions and chlorhexidine given.  Labs: cbc, bmp, T/S,MRSA/MSSA, A1C drawn in PST

## 2024-04-01 NOTE — H&P PST ADULT - PRIMARY CARE PROVIDER
[phone] Last visit on 3/21/24 for medical clearance.   Dr. Bernard Khalil (Neurologist) [phone]

## 2024-04-01 NOTE — H&P PST ADULT - NSANTHBMIRD_ENT_A_CORE
VITAL SIGNS: I have reviewed nursing notes and confirm.  CONSTITUTIONAL: Well-developed; well-nourished; in no acute distress.  SKIN: Skin exam is warm and dry, no acute rash.  HEAD: Normocephalic; atraumatic.  EYES: PERRL, EOM intact; conjunctiva and sclera clear.  ENT: No nasal discharge; airway clear.  NECK: Supple; non tender.  CARD: S1, S2 normal; no murmurs, gallops, or rubs. Regular rate and rhythm.  RESP: Unlabored. No wheezes, rales or rhonchi.  ABD: soft; non-distended; non-tender  EXT: Normal ROM. No cyanosis or edema. Non-ttp all ext, distal pulses intact  LYMPH: No acute cervical adenopathy.  NEURO: Alert, oriented. Grossly unremarkable.  PSYCH: Cooperative, appropriate.
No

## 2024-04-02 ENCOUNTER — TRANSCRIPTION ENCOUNTER (OUTPATIENT)
Age: 44
End: 2024-04-02

## 2024-04-02 LAB
A1C WITH ESTIMATED AVERAGE GLUCOSE RESULT: 5.4 % — SIGNIFICANT CHANGE UP (ref 4–5.6)
ESTIMATED AVERAGE GLUCOSE: 108 MG/DL — SIGNIFICANT CHANGE UP (ref 68–114)
MRSA PCR RESULT.: SIGNIFICANT CHANGE UP
S AUREUS DNA NOSE QL NAA+PROBE: SIGNIFICANT CHANGE UP

## 2024-04-02 RX ORDER — POVIDONE-IODINE 5 %
1 AEROSOL (ML) TOPICAL ONCE
Refills: 0 | Status: DISCONTINUED | OUTPATIENT
Start: 2024-04-03 | End: 2024-04-03

## 2024-04-03 ENCOUNTER — OUTPATIENT (OUTPATIENT)
Dept: INPATIENT UNIT | Facility: HOSPITAL | Age: 44
LOS: 1 days | End: 2024-04-03
Payer: COMMERCIAL

## 2024-04-03 ENCOUNTER — RESULT REVIEW (OUTPATIENT)
Age: 44
End: 2024-04-03

## 2024-04-03 ENCOUNTER — APPOINTMENT (OUTPATIENT)
Dept: ORTHOPEDIC SURGERY | Facility: HOSPITAL | Age: 44
End: 2024-04-03

## 2024-04-03 ENCOUNTER — TRANSCRIPTION ENCOUNTER (OUTPATIENT)
Age: 44
End: 2024-04-03

## 2024-04-03 VITALS
TEMPERATURE: 98 F | RESPIRATION RATE: 18 BRPM | SYSTOLIC BLOOD PRESSURE: 125 MMHG | OXYGEN SATURATION: 97 % | HEART RATE: 88 BPM | WEIGHT: 179.02 LBS | HEIGHT: 60.98 IN | DIASTOLIC BLOOD PRESSURE: 91 MMHG

## 2024-04-03 VITALS
OXYGEN SATURATION: 97 % | RESPIRATION RATE: 18 BRPM | TEMPERATURE: 98 F | HEART RATE: 88 BPM | SYSTOLIC BLOOD PRESSURE: 125 MMHG | DIASTOLIC BLOOD PRESSURE: 91 MMHG

## 2024-04-03 DIAGNOSIS — M51.36 OTHER INTERVERTEBRAL DISC DEGENERATION, LUMBAR REGION: ICD-10-CM

## 2024-04-03 DIAGNOSIS — Z78.9 OTHER SPECIFIED HEALTH STATUS: Chronic | ICD-10-CM

## 2024-04-03 DIAGNOSIS — M51.26 OTHER INTERVERTEBRAL DISC DISPLACEMENT, LUMBAR REGION: ICD-10-CM

## 2024-04-03 LAB
GLUCOSE BLDC GLUCOMTR-MCNC: 120 MG/DL — HIGH (ref 70–99)
GLUCOSE BLDC GLUCOMTR-MCNC: 95 MG/DL — SIGNIFICANT CHANGE UP (ref 70–99)
HCG UR QL: NEGATIVE — SIGNIFICANT CHANGE UP
RH IG SCN BLD-IMP: POSITIVE — SIGNIFICANT CHANGE UP

## 2024-04-03 PROCEDURE — C1889: CPT

## 2024-04-03 PROCEDURE — 97161 PT EVAL LOW COMPLEX 20 MIN: CPT

## 2024-04-03 PROCEDURE — 88311 DECALCIFY TISSUE: CPT

## 2024-04-03 PROCEDURE — 88311 DECALCIFY TISSUE: CPT | Mod: 26

## 2024-04-03 PROCEDURE — 82962 GLUCOSE BLOOD TEST: CPT

## 2024-04-03 PROCEDURE — 72020 X-RAY EXAM OF SPINE 1 VIEW: CPT

## 2024-04-03 PROCEDURE — 88304 TISSUE EXAM BY PATHOLOGIST: CPT | Mod: 26

## 2024-04-03 PROCEDURE — 63047 LAM FACETEC & FORAMOT LUMBAR: CPT

## 2024-04-03 PROCEDURE — 81025 URINE PREGNANCY TEST: CPT

## 2024-04-03 PROCEDURE — 88304 TISSUE EXAM BY PATHOLOGIST: CPT

## 2024-04-03 PROCEDURE — C9399: CPT

## 2024-04-03 DEVICE — SURGIFLO MATRIX WITH THROMBIN KIT: Type: IMPLANTABLE DEVICE | Site: BILATERAL | Status: FUNCTIONAL

## 2024-04-03 DEVICE — SURGIFOAM PAD 8CM X 12.5CM X 10MM (100): Type: IMPLANTABLE DEVICE | Site: BILATERAL | Status: FUNCTIONAL

## 2024-04-03 RX ORDER — CYCLOBENZAPRINE HYDROCHLORIDE 10 MG/1
1 TABLET, FILM COATED ORAL
Refills: 0 | DISCHARGE

## 2024-04-03 RX ORDER — ONDANSETRON 8 MG/1
4 TABLET, FILM COATED ORAL ONCE
Refills: 0 | Status: COMPLETED | OUTPATIENT
Start: 2024-04-03 | End: 2024-04-03

## 2024-04-03 RX ORDER — CYCLOBENZAPRINE HYDROCHLORIDE 10 MG/1
1 TABLET, FILM COATED ORAL
Qty: 42 | Refills: 0
Start: 2024-04-03 | End: 2024-04-16

## 2024-04-03 RX ORDER — MAGNESIUM HYDROXIDE 400 MG/1
30 TABLET, CHEWABLE ORAL EVERY 12 HOURS
Refills: 0 | Status: DISCONTINUED | OUTPATIENT
Start: 2024-04-03 | End: 2024-04-03

## 2024-04-03 RX ORDER — SENNA PLUS 8.6 MG/1
2 TABLET ORAL AT BEDTIME
Refills: 0 | Status: DISCONTINUED | OUTPATIENT
Start: 2024-04-03 | End: 2024-04-03

## 2024-04-03 RX ORDER — CEFAZOLIN SODIUM 1 G
2000 VIAL (EA) INJECTION EVERY 8 HOURS
Refills: 0 | Status: COMPLETED | OUTPATIENT
Start: 2024-04-03 | End: 2024-04-03

## 2024-04-03 RX ORDER — ACETAMINOPHEN 500 MG
1000 TABLET ORAL EVERY 8 HOURS
Refills: 0 | Status: DISCONTINUED | OUTPATIENT
Start: 2024-04-03 | End: 2024-04-03

## 2024-04-03 RX ORDER — POLYETHYLENE GLYCOL 3350 17 G/17G
17 POWDER, FOR SOLUTION ORAL DAILY
Refills: 0 | Status: DISCONTINUED | OUTPATIENT
Start: 2024-04-03 | End: 2024-04-03

## 2024-04-03 RX ORDER — PANTOPRAZOLE SODIUM 20 MG/1
1 TABLET, DELAYED RELEASE ORAL
Qty: 14 | Refills: 0
Start: 2024-04-03 | End: 2024-04-16

## 2024-04-03 RX ORDER — ASPIRIN/CALCIUM CARB/MAGNESIUM 324 MG
1 TABLET ORAL
Qty: 30 | Refills: 0
Start: 2024-04-03 | End: 2024-05-02

## 2024-04-03 RX ORDER — ONDANSETRON 8 MG/1
4 TABLET, FILM COATED ORAL EVERY 6 HOURS
Refills: 0 | Status: DISCONTINUED | OUTPATIENT
Start: 2024-04-03 | End: 2024-04-03

## 2024-04-03 RX ORDER — METHOCARBAMOL 500 MG/1
500 TABLET, FILM COATED ORAL EVERY 8 HOURS
Refills: 0 | Status: DISCONTINUED | OUTPATIENT
Start: 2024-04-03 | End: 2024-04-03

## 2024-04-03 RX ORDER — PANTOPRAZOLE SODIUM 20 MG/1
40 TABLET, DELAYED RELEASE ORAL
Refills: 0 | Status: DISCONTINUED | OUTPATIENT
Start: 2024-04-03 | End: 2024-04-03

## 2024-04-03 RX ORDER — OXYCODONE HYDROCHLORIDE 5 MG/1
5 TABLET ORAL EVERY 4 HOURS
Refills: 0 | Status: DISCONTINUED | OUTPATIENT
Start: 2024-04-03 | End: 2024-04-03

## 2024-04-03 RX ORDER — OXYCODONE HYDROCHLORIDE 5 MG/1
1 TABLET ORAL
Qty: 56 | Refills: 0
Start: 2024-04-03 | End: 2024-04-16

## 2024-04-03 RX ORDER — DEXAMETHASONE 0.5 MG/5ML
6 ELIXIR ORAL EVERY 6 HOURS
Refills: 0 | Status: DISCONTINUED | OUTPATIENT
Start: 2024-04-03 | End: 2024-04-03

## 2024-04-03 RX ORDER — HYDROMORPHONE HYDROCHLORIDE 2 MG/ML
0.5 INJECTION INTRAMUSCULAR; INTRAVENOUS; SUBCUTANEOUS
Refills: 0 | Status: DISCONTINUED | OUTPATIENT
Start: 2024-04-03 | End: 2024-04-03

## 2024-04-03 RX ORDER — SODIUM CHLORIDE 9 MG/ML
1000 INJECTION INTRAMUSCULAR; INTRAVENOUS; SUBCUTANEOUS
Refills: 0 | Status: DISCONTINUED | OUTPATIENT
Start: 2024-04-03 | End: 2024-04-03

## 2024-04-03 RX ORDER — BACLOFEN 100 %
1 POWDER (GRAM) MISCELLANEOUS
Refills: 0 | DISCHARGE

## 2024-04-03 RX ADMIN — Medication 1000 MILLIGRAM(S): at 14:30

## 2024-04-03 RX ADMIN — Medication 1000 MILLIGRAM(S): at 07:24

## 2024-04-03 RX ADMIN — SODIUM CHLORIDE 100 MILLILITER(S): 9 INJECTION INTRAMUSCULAR; INTRAVENOUS; SUBCUTANEOUS at 13:00

## 2024-04-03 RX ADMIN — Medication 1000 MILLIGRAM(S): at 14:13

## 2024-04-03 RX ADMIN — ONDANSETRON 4 MILLIGRAM(S): 8 TABLET, FILM COATED ORAL at 13:00

## 2024-04-03 RX ADMIN — Medication 100 MILLIGRAM(S): at 14:13

## 2024-04-03 RX ADMIN — Medication 6 MILLIGRAM(S): at 14:13

## 2024-04-03 RX ADMIN — APREPITANT 40 MILLIGRAM(S): 80 CAPSULE ORAL at 07:24

## 2024-04-03 NOTE — BRIEF OPERATIVE NOTE - NSICDXBRIEFPROCEDURE_GEN_ALL_CORE_FT
PROCEDURES:  Laminotomy with discectomy of lumbar spine 03-Apr-2024 12:45:44 L4-5 Bilateral Laminotomy and Discectomy Vince Traylor

## 2024-04-03 NOTE — ASU DISCHARGE PLAN (ADULT/PEDIATRIC) - ASU DC SPECIAL INSTRUCTIONSFT
DISCHARGE INSTRUCTIONS:    1. Ambulate as tolerated.   2. No lifting over 5 lbs.   3. Keep bandage on, clean and dry. You may shower with your Aquacel bandage, however do not allow shower stream to beat directly onto the bandage. If bandage becomes soiled you my replace with dry dressings.   4. Pain meds: eRx sent to your pharmacy electronically, you need to pick it up. Oxycodone every 6 hours on an as needed basis for 14 days.   5: Muscle Relaxants: You have been prescribed Cyclobenzaprine 10 mg to be taken three times per day (approximately every 8 hours) on an as-needed basis.   6. Steroids: You have been prescribed an oral Prednisone tape. Please take as directed and do not skip doses.   7. DVT Prophylaxis (Blood Clot Prevention): You have been prescribed Aspirin 81 mg. Please take daily for 30 days and do not skip doses.   8. See Dr. Farfan in about 1 week in the office. Call to schedule or confirm appointment. DISCHARGE INSTRUCTIONS:    1. Ambulate as tolerated.   2. No lifting over 5 lbs.   3. Keep bandage on, clean and dry. You may shower with your Aquacel bandage, however do not allow shower stream to beat directly onto the bandage. If bandage becomes soiled you my replace with dry dressings.   4. Pain meds: eRx sent to your pharmacy electronically, you need to pick it up. Oxycodone every 6 hours on an as needed basis for 14 days.   5: Muscle Relaxants: Continue with your previously-prescribed home Baclofen.   6. Steroids: You have been prescribed an oral Prednisone tape. Please take as directed and do not skip doses.   7. DVT Prophylaxis (Blood Clot Prevention): You have been prescribed Aspirin 81 mg. Please take daily for 30 days and do not skip doses.   8. See Dr. Farfan in about 1 week in the office. Call to schedule or confirm appointment.

## 2024-04-03 NOTE — BRIEF OPERATIVE NOTE - NSICDXBRIEFPREOP_GEN_ALL_CORE_FT
PRE-OP DIAGNOSIS:  Displacement of lumbar intervertebral disc 03-Apr-2024 12:46:28 L4-5 Vince Traylor

## 2024-04-03 NOTE — ASU DISCHARGE PLAN (ADULT/PEDIATRIC) - CARE PROVIDER_API CALL
Howard Farfan  Orthopaedic Surgery  [address], [address]  [place] [address]  Phone: [phone]  Fax: [phone]  Follow Up Time: 1 week

## 2024-04-03 NOTE — PATIENT PROFILE ADULT - NSPROPTRIGHTBILLOFRIGHTS_GEN_A_NUR
Nephrology Progress Note      Patient: Evelyn Mi               Sex: female            MRN:  [id number]      YOB: 1971      Age:  49 year old           Date: 4/24/2021    Assessment/Plan     Hyponatremia  Hypervolemic/ HF   She got tolvaptan 15 mg 4/11, 30 mg on 4/12, 45 mg on 4/13,  45 mg on 4/14.   With augmenting RV support, Na has improved. Monitor with lasix diuresis. Now normal.        S/p HM-3  Rv dysfunction  CV seeing.   Off dobutamine  On milrinone     Ckd 3 stable cr 1.2-1.3 mg/dl range.   - OMAR likely due to CRS-1, RV dysfunction, hemodynamic/volume related issuesCr has improved down to 1.1 mg/dl. Monitor with bumex gtt 1 mg/hr and milrinone support.      Hypokalemia , replete as needed along with Mg     Anemia. PRBC per CV team.      D/w ICU RN.     Subjective:   04/24/21 :   Comfortable      Reports improving UE and LE edema.   bumex gtt   On milrinone  Not in distress      PMHx, FHx, SHx, and review of systems reviewed and otherwise unchanged.    Reviewed on 4/24/2021      Objective:     Visit Vitals  BP (!) 128/107 (BP Location: LUE - Left upper extremity)   Pulse 94   Temp 97.5 °F (36.4 °C) (Oral)   Resp (!) 22   Ht 5' 4\" (1.626 m)   Wt 100.9 kg (222 lb 7.1 oz)   SpO2 99%   BMI 38.18 kg/m²      I/O last 3 completed shifts:  In: 1309.6 [P.O.:50; I.V.:939.8; IV Piggyback:319.8]  Out: 3190 [Urine:3190]    Physical Exam:  General: awake Alert and in no distress   Neck: supple and trachea at midline   HEENT: atraumatic and normocephalic.   Eyes: clear conjunctivae with no icterus   Lungs: no increase work of breathing and Decrease breath sounds over the bases b/l   Heart: VAD sounds ; no heaves   Abd: soft and nondistended   UG: has external cath   Extremities: +ve LE and UE edema   Psych: responsive and engaged   Neuro: grossly normal       Lab/Data Reviewed:    CBC:   Recent Labs   Lab 04/24/21  0259 04/23/21  0219 04/22/21  0322 04/21/21  0318   WBC 6.7 8.9 7.9 6.8   RBC 3.69*  3.90* 3.86* 3.91*   HGB 9.5* 10.1* 10.0* 10.1*   HCT 30.7* 33.3* 32.9* 33.0*   MCV 83.2 85.4 85.2 84.4   MCHC 30.9* 30.3* 30.4* 30.6*   RDW-CV 20.6* 21.3* 21.2* 21.2*    168 180 236   Lymphocytes, Percent 21 18 14 16     CMP:  Recent Labs   Lab 04/24/21  0259 04/23/21  1423 04/23/21  0219 04/22/21  1253 04/22/21  0322 04/21/21  0318   SODIUM 137 138 140 139 141 138   POTASSIUM 3.8 4.0 4.0 3.9 3.9 3.4   CHLORIDE 103 102 105 104 104 99   CO2 29 31 32 28 32 33*   BUN 26* 28* 27* 28* 29* 23*   CREATININE 1.18* 1.31* 1.28* 1.42* 1.50* 1.62*   GLUCOSE 109* 147* 116* 139* 115* 97   ALBUMIN 3.0*  --  2.9* 3.0* 3.0* 3.2*   PHOS  --   --  3.0  --  3.6 3.7   GPT 22  --  25 27 26 26   ALKPT 154*  --  175* 166* 163* 154*   BILIRUBIN 1.0  --  0.9 1.0 1.0 1.3*   MG 2.2  --  2.4 2.4 2.4 2.5*     Magnesium   Date Value Ref Range Status   04/24/2021 2.2 1.7 - 2.4 mg/dL Final   04/23/2021 2.4 1.7 - 2.4 mg/dL Final   04/22/2021 2.4 1.7 - 2.4 mg/dL Final     Phosphorus   Date Value Ref Range Status   04/23/2021 3.0 2.4 - 4.7 mg/dL Final   04/22/2021 3.6 2.4 - 4.7 mg/dL Final   04/21/2021 3.7 2.4 - 4.7 mg/dL Final     • aspirin (ECOTRIN) enteric coated tablet 81 mg Oral Daily   • bisacodyl (DULCOLAX) EC tablet 20 mg Oral Once   • darunavir (PREZISTA) tablet 600 mg Oral BID   • digoxin (LANOXIN) tablet 125 mcg Oral Daily   • docusate sodium-sennosides (SENOKOT S) 50-8.6 MG 2 tablet Oral Q12H   • emtricitabine-tenofovir DISOPROXIL (TRUVADA) 200-300 MG per tablet 1 tablet Oral Daily   • escitalopram (LEXAPRO) tablet 10 mg Oral Daily   • hydrALAZINE (APRESOLINE) tablet 50 mg Oral 3 times per day   • insulin lispro (ADMELOG,HumaLOG) scheduled AND correction dose Subcutaneous TID WC   • magnesium citrate (CITROMA) solution 300 mL Oral Once   • magnesium oxide (MAG-OX) tablet 400 mg Oral Daily   • Magnesium Standard Replacement Protocol Does not apply See Admin Instructions   • metoCLOPramide (REGLAN) injection 5 mg Intravenous TID   •  mineral oil (FLEET) enema 133 mL Rectal Once   • pantoprazole (PROTONIX) EC tablet 40 mg Oral Nightly   • polyethylene glycol (MIRALAX) packet 17 g Oral Daily   • potassium CHLORIDE (KLOR-CON M) raven ER tablet 20 mEq Oral BID WC   • raltegravir (ISENTRESS) tablet 400 mg Oral BID   • ritonavir (NORVIR) tablet 100 mg Oral BID WC   • sodium chloride (PF) 0.9 % injection 10 mL Injection 2 times per day   • spironolactone (ALDACTONE) tablet 50 mg Oral Daily   • WARFARIN - PHYSICIAN MONITORED 1 each Does not apply Q Evening     Nephrology Progress Note    Subjective     Objective     • bisacodyl  20 mg Oral Once   • magnesium citrate  300 mL Oral Once   • metoCLOPramide (REGLAN) injection  5 mg Intravenous TID   • hydrALAZINE  50 mg Oral 3 times per day   • sodium chloride (PF)  10 mL Injection 2 times per day   • escitalopram  10 mg Oral Daily   • digoxin  125 mcg Oral Daily   • spironolactone  50 mg Oral Daily   • potassium CHLORIDE  20 mEq Oral BID WC   • mineral oil  133 mL Rectal Once   • magnesium oxide  400 mg Oral Daily   • polyethylene glycol  17 g Oral Daily   • aspirin  81 mg Oral Daily   • pantoprazole  40 mg Oral Nightly   • insulin lispro   Subcutaneous TID WC   • WARFARIN - PHYSICIAN MONITORED 1 each  1 each Does not apply Q Evening   • docusate sodium-sennosides  2 tablet Oral Q12H   • Magnesium Standard Replacement Protocol   Does not apply See Admin Instructions   • ritonavir  100 mg Oral BID WC   • emtricitabine-tenofovir DISOPROXIL  1 tablet Oral Daily   • darunavir  600 mg Oral BID   • raltegravir  400 mg Oral BID       I/O's    Intake/Output Summary (Last 24 hours) at 4/24/2021 1537  Last data filed at 4/24/2021 1200  Gross per 24 hour   Intake 1301.94 ml   Output 3170 ml   Net -1868.06 ml         Last Recorded Vitals  Blood pressure (!) 128/107, pulse 94, temperature 97.5 °F (36.4 °C), temperature source Oral, resp. rate (!) 22, height 5' 4\" (1.626 m), weight 100.9 kg (222 lb 7.1 oz), SpO2 99  %.  Body mass index is 38.18 kg/m².    Physical Exam    Labs   Recent Results (from the past 24 hour(s))   GLUCOSE, BEDSIDE - POINT OF CARE    Collection Time: 04/23/21  4:42 PM   Result Value Ref Range    GLUCOSE, BEDSIDE - POINT OF CARE 101 (H) 70 - 99 mg/dL   GLUCOSE, BEDSIDE - POINT OF CARE    Collection Time: 04/23/21  8:54 PM   Result Value Ref Range    GLUCOSE, BEDSIDE - POINT OF CARE 123 (H) 70 - 99 mg/dL   Comprehensive Metabolic Panel    Collection Time: 04/24/21  2:59 AM   Result Value Ref Range    Fasting Status      Sodium 137 135 - 145 mmol/L    Potassium 3.8 3.4 - 5.1 mmol/L    Chloride 103 98 - 107 mmol/L    Carbon Dioxide 29 21 - 32 mmol/L    Anion Gap 9 (L) 10 - 20 mmol/L    Glucose 109 (H) 65 - 99 mg/dL    BUN 26 (H) 6 - 20 mg/dL    Creatinine 1.18 (H) 0.51 - 0.95 mg/dL    Glomerular Filtration Rate 63 (L) >90 mL/min/1.73m2    BUN/ Creatinine Ratio 22 7 - 25    Calcium 9.3 8.4 - 10.2 mg/dL    Bilirubin, Total 1.0 0.2 - 1.0 mg/dL    GOT/AST 45 (H) <=37 Units/L    GPT/ALT 22 <64 Units/L    Alkaline Phosphatase 154 (H) 45 - 117 Units/L    Albumin 3.0 (L) 3.6 - 5.1 g/dL    Protein, Total 7.5 6.4 - 8.2 g/dL    Globulin 4.5 (H) 2.0 - 4.0 g/dL    A/G Ratio 0.7 (L) 1.0 - 2.4   Lactate Dehydrogenase    Collection Time: 04/24/21  2:59 AM   Result Value Ref Range    LD, Total 309 (H) 82 - 240 Units/L   Magnesium    Collection Time: 04/24/21  2:59 AM   Result Value Ref Range    Magnesium 2.2 1.7 - 2.4 mg/dL   NT proBNP    Collection Time: 04/24/21  2:59 AM   Result Value Ref Range    NT-proBNP 2,885 (H) <=125 pg/mL   Prothrombin Time    Collection Time: 04/24/21  2:59 AM   Result Value Ref Range    Prothrombin Time 24.9 (H) 9.7 - 11.8 sec    INR 2.4 <=5.0   CBC with Automated Differential (performable only)    Collection Time: 04/24/21  2:59 AM   Result Value Ref Range    WBC 6.7 4.2 - 11.0 K/mcL    RBC 3.69 (L) 4.00 - 5.20 mil/mcL    HGB 9.5 (L) 12.0 - 15.5 g/dL    HCT 30.7 (L) 36.0 - 46.5 %    MCV 83.2 78.0  - 100.0 fl    MCH 25.7 (L) 26.0 - 34.0 pg    MCHC 30.9 (L) 32.0 - 36.5 g/dL    RDW-CV 20.6 (H) 11.0 - 15.0 %    RDW-SD 62.9 (H) 39.0 - 50.0 fL     140 - 450 K/mcL    NRBC 0 <=0 /100 WBC    Neutrophil, Percent 69 %    Lymphocytes, Percent 21 %    Mono, Percent 8 %    Eosinophils, Percent 2 %    Basophils, Percent 0 %    Immature Granulocytes 0 %    Absolute Neutrophils 4.6 1.8 - 7.7 K/mcL    Absolute Lymphocytes 1.4 1.0 - 4.8 K/mcL    Absolute Monocytes 0.6 0.3 - 0.9 K/mcL    Absolute Eosinophils  0.2 0.0 - 0.5 K/mcL    Absolute Basophils 0.0 0.0 - 0.3 K/mcL    Absolute Immmature Granulocytes 0.0 0.0 - 0.2 K/mcL   GLUCOSE, BEDSIDE - POINT OF CARE    Collection Time: 04/24/21  7:28 AM   Result Value Ref Range    GLUCOSE, BEDSIDE - POINT OF CARE 116 (H) 70 - 99 mg/dL   GLUCOSE, BEDSIDE - POINT OF CARE    Collection Time: 04/24/21 11:54 AM   Result Value Ref Range    GLUCOSE, BEDSIDE - POINT OF CARE 120 (H) 70 - 99 mg/dL       Assessment & Plan     Discussed with teams and RN.     Woo Agarwal MD  4/24/2021       patient

## 2024-04-03 NOTE — ASU DISCHARGE PLAN (ADULT/PEDIATRIC) - NS MD DC FALL RISK RISK
For information on Fall & Injury Prevention, visit: [email or web address] OR  [email or web address] OR  https://www.cdc.gov/steadi/patient.html

## 2024-04-03 NOTE — PHYSICAL THERAPY INITIAL EVALUATION ADULT - ADDITIONAL COMMENTS
Pt resides in a private home with her family. +3 steps to enter and 1 flight to second level. PTA, pt was independent with all mobility/ADLs

## 2024-04-03 NOTE — ASU DISCHARGE PLAN (ADULT/PEDIATRIC) - PROCEDURE
L4-5 Bilateral Laminotomy and Discectomy L4-5 Bilateral Laminotomy and Discectomy. Goal of Stay: "to be pain free"

## 2024-04-03 NOTE — PHYSICAL THERAPY INITIAL EVALUATION ADULT - PERTINENT HX OF CURRENT PROBLEM, REHAB EVAL
Today patient presents to [facility] for evaluation for scheduled L4-L5 Posterior Lumbar bilateral laminotomy and Discectomy on 4/3/24.

## 2024-04-03 NOTE — CHART NOTE - NSCHARTNOTEFT_GEN_A_CORE
POIC    Patient Resting without complaints.    No Chest Pain, SOB, N/V.    Pre op s/sx :  B/L Radiculopathy L  R  ; Post op, patient reports:  much improved    Exam:   Alert/Oriented, No Acute Distress  Cards: +S1/S2, RRR  Pulm: CTAB  BACK:          Dressing: [ x] Aquacel clean/dry/intact \          Drains: : NONE                Motor exam: [ x ]                  [x ] Lower extremity            PF          DF         EHL       FHL                                                                                            R        5/5        5/5        5/5       5/5                                                        L         5/5        5/5        5/5       5/5                                                                  Calves Soft/Non-tender bilaterally            Sensation: [ x] intact to light touch         [x ] warm well perfused; capillary refill <3 seconds            Labs: None        A/P :  43y Female s/p Laminotomy with discectomy of lumbar spine    -    Pain control  -    Taper  -    DVT ppx: SCDs             ** Spoke with Attending:  NO dopplers required            Patient @ low risk for DVt            Patient prescribed ASA 81 QD starting 4/4 x 1 month (or when told to stop by surgeon)  -    Physical Therapy  -    Weight bearing status: WBAT [ x]     -    Dispo: Home d/c home once dleared by PT and after receiving her 3pm dose of Abx         Follow up Dr Farfan in office      ***See Above  [facility]  Orthopedics  B: [phone]  S: [phone]
CAPRINI SCORE [CLOT]    AGE RELATED RISK FACTORS                                                       MOBILITY RELATED FACTORS  [x] Age 41-60 years                                            (1 Point)                  [ ] Bed rest                                                        (1 Point)  [ ] Age: 61-74 years                                           (2 Points)                 [ ] Plaster cast                                                   (2 Points)  [ ] Age= 75 years                                              (3 Points)                 [ ] Bed bound for more than 72 hours                 (2 Points)    DISEASE RELATED RISK FACTORS                                               GENDER SPECIFIC FACTORS  [ ] Edema in the lower extremities                       (1 Point)                  [ ] Pregnancy                                                     (1 Point)  [ ] Varicose veins                                               (1 Point)                  [ ] Post-partum < 6 weeks                                   (1 Point)             [x] BMI > 25 Kg/m2                                            (1 Point)                  [ ] Hormonal therapy  or oral contraception          (1 Point)                 [ ] Sepsis (in the previous month)                        (1 Point)                  [ ] History of pregnancy complications                 (1 point)  [ ] Pneumonia or serious lung disease                                               [ ] Unexplained or recurrent                     (1 Point)           (in the previous month)                               (1 Point)  [ ] Abnormal pulmonary function test                     (1 Point)                 SURGERY RELATED RISK FACTORS  [ ] Acute myocardial infarction                              (1 Point)                 [ ]  Section                                             (1 Point)  [ ] Congestive heart failure (in the previous month)  (1 Point)               [ ] Minor surgery                                                  (1 Point)   [ ] Inflammatory bowel disease                             (1 Point)                 [ ] Arthroscopic surgery                                        (2 Points)  [ ] Central venous access                                      (2 Points)               [x] General surgery lasting more than 45 minutes   (2 Points)       [ ] Stroke (in the previous month)                          (5 Points)               [ ] Elective arthroplasty                                         (5 Points)                                                                                                                                               HEMATOLOGY RELATED FACTORS                                                 TRAUMA RELATED RISK FACTORS  [ ] Prior episodes of VTE                                     (3 Points)                [ ] Fracture of the hip, pelvis, or leg                       (5 Points)  [ ] Positive family history for VTE                         (3 Points)                 [ ] Acute spinal cord injury (in the previous month)  (5 Points)  [ ] Prothrombin 77866 A                                     (3 Points)                 [ ] Paralysis  (less than 1 month)                             (5 Points)  [ ] Factor V Leiden                                             (3 Points)                  [ ] Multiple Trauma within 1 month                        (5 Points)  [ ] Lupus anticoagulants                                     (3 Points)                                                           [ ] Anticardiolipin antibodies                               (3 Points)                                                       [ ] High homocysteine in the blood                      (3 Points)                                             [ ] Other congenital or acquired thrombophilia      (3 Points)                                                [ ] Heparin induced thrombocytopenia                  (3 Points)                                          Total Score [   4   ]    Caprini Score 0 - 2:  Low Risk, No VTE Prophylaxis required for most patients, encourage ambulation  Caprini Score 3 - 6:  At Risk, pharmacologic VTE prophylaxis is indicated for most patients (in the absence of a contraindication)  Caprini Score Greater than or = 7:  High Risk, pharmacologic VTE prophylaxis is indicated for most patients (in the absence of a contraindication)      Mary Brizuela PA-C  Orthopedic Surgery  Team Pager: [phone]

## 2024-04-03 NOTE — BRIEF OPERATIVE NOTE - NSICDXBRIEFPOSTOP_GEN_ALL_CORE_FT
POST-OP DIAGNOSIS:  Displacement of lumbar intervertebral disc 03-Apr-2024 12:46:49 L4-5 Vince Traylor

## 2024-04-09 ENCOUNTER — APPOINTMENT (OUTPATIENT)
Dept: ORTHOPEDIC SURGERY | Facility: CLINIC | Age: 44
End: 2024-04-09
Payer: COMMERCIAL

## 2024-04-09 VITALS — BODY MASS INDEX: 33.99 KG/M2 | HEIGHT: 61 IN | WEIGHT: 180 LBS

## 2024-04-09 PROCEDURE — 99024 POSTOP FOLLOW-UP VISIT: CPT

## 2024-04-09 RX ORDER — MELOXICAM 15 MG/1
15 TABLET ORAL
Qty: 30 | Refills: 1 | Status: ACTIVE | COMMUNITY
Start: 2024-04-09 | End: 1900-01-01

## 2024-04-09 RX ORDER — CYCLOBENZAPRINE HYDROCHLORIDE 10 MG/1
10 TABLET, FILM COATED ORAL
Qty: 30 | Refills: 1 | Status: ACTIVE | COMMUNITY
Start: 2024-04-09 | End: 1900-01-01

## 2024-04-09 NOTE — RETURN TO WORK/SCHOOL
[Return Date: _____] : as of [unfilled].  This has been discussed in detail with ~Yossi~ and ~he/she~ understands this. [Work] : work [___ Weeks] : I will re-evaluate the patient in [unfilled] week(s), at which time I will provide an update to their current status

## 2024-04-09 NOTE — HISTORY OF PRESENT ILLNESS
[Clean/Dry/Intact] : clean, dry and intact [Doing Well] : is doing well [Excellent Pain Control] : has excellent pain control [No Sign of Infection] : is showing no signs of infection [No ADLs] : to avoid most activities of daily living [No Work] : not to work [No Housework] : not to do housework [de-identified] : s/p L4-5 Bilateral Laminotomy and Discectomy, DOS: 4/3/24 [de-identified] : Ms. OLIVERIO QUESADA is a 43 year old woman presents today for 1st post-op appointment s/p L4-5 Bilateral Laminotomy and Discectomy on 4/3/24. She is currently ambulating with a walker. She is taking Aspirin, prednisone taper, oxycodone 5mg BID or as needed and protonix. She notes mild stiffness of the lower back. Preop symptoms markedly improved.  [de-identified] : Mobic, flexeril, PT, RTO 6 weeks.

## 2024-04-11 LAB — SURGICAL PATHOLOGY STUDY: SIGNIFICANT CHANGE UP

## 2024-04-19 ENCOUNTER — NON-APPOINTMENT (OUTPATIENT)
Age: 44
End: 2024-04-19

## 2024-04-23 ENCOUNTER — APPOINTMENT (OUTPATIENT)
Dept: ORTHOPEDIC SURGERY | Facility: CLINIC | Age: 44
End: 2024-04-23
Payer: COMMERCIAL

## 2024-04-23 VITALS — HEIGHT: 61 IN | BODY MASS INDEX: 33.99 KG/M2 | WEIGHT: 180 LBS

## 2024-04-23 PROCEDURE — 99024 POSTOP FOLLOW-UP VISIT: CPT

## 2024-04-23 PROCEDURE — 72100 X-RAY EXAM L-S SPINE 2/3 VWS: CPT

## 2024-05-15 ENCOUNTER — NON-APPOINTMENT (OUTPATIENT)
Age: 44
End: 2024-05-15

## 2024-06-11 ENCOUNTER — APPOINTMENT (OUTPATIENT)
Dept: ORTHOPEDIC SURGERY | Facility: CLINIC | Age: 44
End: 2024-06-11
Payer: COMMERCIAL

## 2024-06-11 VITALS — WEIGHT: 180 LBS | BODY MASS INDEX: 33.99 KG/M2 | HEIGHT: 61 IN

## 2024-06-11 DIAGNOSIS — M51.26 OTHER INTERVERTEBRAL DISC DISPLACEMENT, LUMBAR REGION: ICD-10-CM

## 2024-06-11 DIAGNOSIS — M51.36 OTHER INTERVERTEBRAL DISC DEGENERATION, LUMBAR REGION: ICD-10-CM

## 2024-06-11 PROCEDURE — 99024 POSTOP FOLLOW-UP VISIT: CPT

## 2024-06-11 NOTE — RETURN TO WORK/SCHOOL
[Return Date: _____] : as of [unfilled].  This has been discussed in detail with ~Yossi~ and ~he/she~ understands this. [FreeTextEntry2] : Howard Farfan MD

## 2024-06-11 NOTE — HISTORY OF PRESENT ILLNESS
[Healed] : healed [Neuro Intact] : an unremarkable neurological exam [Xray (Date:___)] : [unfilled] Xray -  [No Obvious Fractures] : no obvious fractures [Good Overall Alignment] : good overall alignment [Doing Well] : is doing well [Excellent Pain Control] : has excellent pain control [No Sign of Infection] : is showing no signs of infection [No ADLs] : to avoid most activities of daily living [No Work] : not to work [No Housework] : not to do housework [de-identified] : Pt is s/p L4-5 Bilateral Laminotomy and Discectomy, DOS: 4/3/24. [de-identified] : OLIVERIO QUESADA is a 43 year old female who is s/p L4-5 Bilateral Laminotomy and Discectomy, DOS: 4/3/24. Patient presents for follow-up visit today, she reports lower back stiffness after laying down for prolonged period of time, which resolves after walking a few step. Patient reports pain to calves and stiffness in hamstrings. Patient denies any radiating pain, numbness, tingling, or weakness to B/L LE. Patient denies taking any prescription or OTC pain medications at this time. Patient is currently participating in PT 2x/week, which provides pain relief. Patient is able to ambulate without assistance and can perform ADL's independently. Patient reports overall improvement in pre-op symptoms. [de-identified] : NSAIDS, PT, HEP, should improve over time, RTO 6 weeks.

## 2024-06-20 ENCOUNTER — APPOINTMENT (OUTPATIENT)
Dept: MAMMOGRAPHY | Facility: IMAGING CENTER | Age: 44
End: 2024-06-20
Payer: COMMERCIAL

## 2024-06-20 ENCOUNTER — APPOINTMENT (OUTPATIENT)
Dept: ULTRASOUND IMAGING | Facility: IMAGING CENTER | Age: 44
End: 2024-06-20
Payer: COMMERCIAL

## 2024-06-20 ENCOUNTER — OUTPATIENT (OUTPATIENT)
Dept: OUTPATIENT SERVICES | Facility: HOSPITAL | Age: 44
LOS: 1 days | End: 2024-06-20
Payer: COMMERCIAL

## 2024-06-20 DIAGNOSIS — Z78.9 OTHER SPECIFIED HEALTH STATUS: Chronic | ICD-10-CM

## 2024-06-20 DIAGNOSIS — Z00.8 ENCOUNTER FOR OTHER GENERAL EXAMINATION: ICD-10-CM

## 2024-06-20 PROCEDURE — 77062 BREAST TOMOSYNTHESIS BI: CPT | Mod: 26

## 2024-06-20 PROCEDURE — G0279: CPT | Mod: 26

## 2024-06-20 PROCEDURE — G0279: CPT

## 2024-06-20 PROCEDURE — 76642 ULTRASOUND BREAST LIMITED: CPT | Mod: 26,50

## 2024-06-20 PROCEDURE — 77066 DX MAMMO INCL CAD BI: CPT

## 2024-06-20 PROCEDURE — 77066 DX MAMMO INCL CAD BI: CPT | Mod: 26

## 2024-06-20 PROCEDURE — 76642 ULTRASOUND BREAST LIMITED: CPT

## 2024-07-03 ENCOUNTER — NON-APPOINTMENT (OUTPATIENT)
Age: 44
End: 2024-07-03

## 2024-09-03 ENCOUNTER — NON-APPOINTMENT (OUTPATIENT)
Age: 44
End: 2024-09-03

## (undated) DEVICE — SYR LUER LOK 10CC

## (undated) DEVICE — POSITIONER FOAM EGG CRATE ULNAR 2PCS (PINK)

## (undated) DEVICE — VAGINAL PACKING 2 X 6"

## (undated) DEVICE — ELCTR BOVIE PENCIL HANDPIECE ROCKER SWITCH 15FT

## (undated) DEVICE — SUT MONOCRYL 4-0 18" PS-2

## (undated) DEVICE — MIDAS REX MR8 MATCH HEAD FLUTED LG BORE 3MM X 14CM

## (undated) DEVICE — DRAPE IOBAN 23" X 23"

## (undated) DEVICE — POSITIONER FOAM HEADREST (PINK)

## (undated) DEVICE — SUT POLYSORB 0 36" GU-46

## (undated) DEVICE — VENODYNE/SCD SLEEVE CALF MEDIUM

## (undated) DEVICE — MARKING PEN W RULER

## (undated) DEVICE — SUT VICRYL 2-0 18" CP-2 UNDYED (POP-OFF)

## (undated) DEVICE — ELCTR BOVIE TIP BLADE INSULATED 6.5" EDGE

## (undated) DEVICE — NDL HYPO SAFE 22G X 1.5" (BLACK)

## (undated) DEVICE — POSITIONER CUSHION INSERT PRONE VIEW LG

## (undated) DEVICE — SYR LUER LOK 20CC

## (undated) DEVICE — DRSG TELFA 3 X 8

## (undated) DEVICE — SOL IRR POUR H2O 250ML

## (undated) DEVICE — PACK LUMBAR LAMI

## (undated) DEVICE — TAPE SILK 3"

## (undated) DEVICE — WARMING BLANKET UPPER ADULT

## (undated) DEVICE — MIDAS REX MR7 LUBRICANT DIFFUSER CARTRIDGE

## (undated) DEVICE — BIPOLAR FORCEP SYMMETRY BAYONET 7" X 1.5MM SMOOTH (SILVER)

## (undated) DEVICE — SYR LUER LOK 50CC

## (undated) DEVICE — ELCTR BOVIE PENCIL SMOKE EVACUATION

## (undated) DEVICE — NDL SPINAL 18G X 3.5" (PINK)

## (undated) DEVICE — SPECIMEN CONTAINER 100ML

## (undated) DEVICE — SOL IRR POUR NS 0.9% 500ML

## (undated) DEVICE — DRSG TEGADERM 4X4.75"

## (undated) DEVICE — GLV 8.5 PROTEXIS (WHITE)

## (undated) DEVICE — PREP DURAPREP 26CC

## (undated) DEVICE — SUT VICRYL PLUS 0 18" OS-6 (POP-OFF)

## (undated) DEVICE — NDL HYPO SAFE 18G X 1.5" (PINK)

## (undated) DEVICE — Device

## (undated) DEVICE — DRAPE 1/2 SHEET 40X57"

## (undated) DEVICE — DRSG DERMABOND 0.7ML

## (undated) DEVICE — DRSG CURITY GAUZE SPONGE 4 X 4" 12-PLY